# Patient Record
Sex: FEMALE | Race: WHITE | NOT HISPANIC OR LATINO | Employment: UNEMPLOYED | URBAN - NONMETROPOLITAN AREA
[De-identification: names, ages, dates, MRNs, and addresses within clinical notes are randomized per-mention and may not be internally consistent; named-entity substitution may affect disease eponyms.]

---

## 2024-03-08 ENCOUNTER — OFFICE VISIT (OUTPATIENT)
Dept: INTERNAL MEDICINE | Facility: CLINIC | Age: 64
End: 2024-03-08
Payer: MEDICAID

## 2024-03-08 VITALS
WEIGHT: 195 LBS | TEMPERATURE: 97.5 F | RESPIRATION RATE: 16 BRPM | SYSTOLIC BLOOD PRESSURE: 118 MMHG | BODY MASS INDEX: 34.55 KG/M2 | HEART RATE: 55 BPM | DIASTOLIC BLOOD PRESSURE: 68 MMHG | OXYGEN SATURATION: 95 % | HEIGHT: 63 IN

## 2024-03-08 DIAGNOSIS — E89.0 H/O PARTIAL THYROIDECTOMY: ICD-10-CM

## 2024-03-08 DIAGNOSIS — Z00.00 ANNUAL PHYSICAL EXAM: Primary | ICD-10-CM

## 2024-03-08 DIAGNOSIS — E66.09 CLASS 1 OBESITY DUE TO EXCESS CALORIES WITHOUT SERIOUS COMORBIDITY WITH BODY MASS INDEX (BMI) OF 34.0 TO 34.9 IN ADULT: ICD-10-CM

## 2024-03-08 DIAGNOSIS — E03.9 ACQUIRED HYPOTHYROIDISM: ICD-10-CM

## 2024-03-08 LAB
ALBUMIN SERPL-MCNC: 4.4 G/DL (ref 3.5–5.2)
ALBUMIN/GLOB SERPL: 2 G/DL
ALP SERPL-CCNC: 62 U/L (ref 39–117)
ALT SERPL-CCNC: 33 U/L (ref 1–33)
AST SERPL-CCNC: 26 U/L (ref 1–32)
BILIRUB SERPL-MCNC: 0.4 MG/DL (ref 0–1.2)
BUN SERPL-MCNC: 10 MG/DL (ref 8–23)
BUN/CREAT SERPL: 13.2 (ref 7–25)
CALCIUM SERPL-MCNC: 8.9 MG/DL (ref 8.6–10.5)
CHLORIDE SERPL-SCNC: 105 MMOL/L (ref 98–107)
CO2 SERPL-SCNC: 25 MMOL/L (ref 22–29)
CREAT SERPL-MCNC: 0.76 MG/DL (ref 0.57–1)
EGFRCR SERPLBLD CKD-EPI 2021: 89.3 ML/MIN/1.73
ERYTHROCYTE [DISTWIDTH] IN BLOOD BY AUTOMATED COUNT: 12.3 % (ref 12.3–15.4)
GLOBULIN SER CALC-MCNC: 2.2 GM/DL
GLUCOSE SERPL-MCNC: 90 MG/DL (ref 65–99)
HCT VFR BLD AUTO: 42.2 % (ref 34–46.6)
HGB BLD-MCNC: 13.8 G/DL (ref 12–15.9)
MCH RBC QN AUTO: 28.7 PG (ref 26.6–33)
MCHC RBC AUTO-ENTMCNC: 32.7 G/DL (ref 31.5–35.7)
MCV RBC AUTO: 87.7 FL (ref 79–97)
PLATELET # BLD AUTO: 242 10*3/MM3 (ref 140–450)
POTASSIUM SERPL-SCNC: 4.2 MMOL/L (ref 3.5–5.2)
PROT SERPL-MCNC: 6.6 G/DL (ref 6–8.5)
RBC # BLD AUTO: 4.81 10*6/MM3 (ref 3.77–5.28)
SODIUM SERPL-SCNC: 141 MMOL/L (ref 136–145)
T4 FREE SERPL-MCNC: 1.11 NG/DL (ref 0.93–1.7)
TSH SERPL DL<=0.005 MIU/L-ACNC: 0.36 UIU/ML (ref 0.27–4.2)
WBC # BLD AUTO: 5.32 10*3/MM3 (ref 3.4–10.8)

## 2024-03-08 RX ORDER — THYROID 60 MG/1
60 TABLET ORAL EVERY OTHER DAY
Qty: 90 TABLET | Refills: 1 | Status: CANCELLED | OUTPATIENT
Start: 2024-03-08

## 2024-03-08 NOTE — PROGRESS NOTES
Subjective   Ariela Truong is a 63 y.o. female and is here for a comprehensive physical exam. The patient reports no problems.    HPI: here today to establish care and get medication refills.   Partial thyroidectomy, never had chemo or radiation. On armour thyroid for about 10 years. She feels she tolerates this better than levothyroxine.   Denies fatigue, changes in bowel habits, and temperature intolerance.   See's holistic physician and prefers holistic approach.   She may be interested in cologuard, doesn't want to do at this time. Declines mammogram and all vaccinations. Never had abnormal mammogram in the past.    Health Habits:  Eye exam within last 2 years? Yes.   Dental exam every 6 months? No, will schedule.   Exercise habits: no structured exercise, stays active with house cleaning and hiking.  Healthy diet? Typical American diet.     The ASCVD Risk score (Adelina CONCEPCION, et al., 2019) failed to calculate for the following reasons:    Cannot find a previous HDL lab    Cannot find a previous total cholesterol lab      Do you take any herbs or supplements that were not prescribed by a doctor? yes- tumeric, selinium, D3-K2-zinc, cranberry supplement, celtic salt, kaya, berberine.    Are you taking calcium supplements? No  Are you taking aspirin daily? No     History:  LMP: No LMP recorded. Patient has had a hysterectomy.  Menopause: Yes  Last pap date: Total hysterectomy  Abnormal pap? no  Family history of breast or ovarian cancer: no    OB History   No obstetric history on file.     Sexual activity questions deferred to the physician.  The following portions of the patient's history were reviewed and updated as appropriate: She  has no past medical history on file.  She does not have any pertinent problems on file.  She  has a past surgical history that includes Thyroidectomy ();  section; and Hysterectomy ().  Her family history includes Bone cancer in her father; Cervical cancer in  "her mother; Glaucoma in her maternal grandmother; Heart failure in her paternal grandmother; Other in her father.  She  reports that she has never smoked. She has been exposed to tobacco smoke. She has never used smokeless tobacco. She reports that she does not currently use alcohol. She reports that she does not use drugs.  Current Outpatient Medications   Medication Sig Dispense Refill    thyroid (ARMOUR) 60 MG tablet Take 1 tablet by mouth every other day. 30 tablet 0     No current facility-administered medications for this visit.     Objective   /68   Pulse 55   Temp 97.5 °F (36.4 °C)   Resp 16   Ht 160 cm (63\")   Wt 88.5 kg (195 lb)   SpO2 95%   BMI 34.54 kg/m²     Physical Exam  Vitals and nursing note reviewed.   Constitutional:       General: She is not in acute distress.     Appearance: Normal appearance. She is obese.   HENT:      Right Ear: Tympanic membrane and ear canal normal.      Left Ear: Tympanic membrane and ear canal normal.      Nose: Nose normal.      Mouth/Throat:      Mouth: Mucous membranes are moist.      Pharynx: Oropharynx is clear. No posterior oropharyngeal erythema.   Eyes:      Extraocular Movements: Extraocular movements intact.      Pupils: Pupils are equal, round, and reactive to light.   Neck:      Thyroid: No thyroid mass or thyromegaly.      Vascular: No carotid bruit.   Cardiovascular:      Rate and Rhythm: Normal rate and regular rhythm.      Pulses: Normal pulses.      Heart sounds: Normal heart sounds. No murmur heard.  Pulmonary:      Effort: Pulmonary effort is normal.      Breath sounds: Normal breath sounds. No wheezing.   Abdominal:      General: Bowel sounds are normal. There is no distension.      Palpations: Abdomen is soft. There is no mass.      Tenderness: There is no abdominal tenderness.   Musculoskeletal:         General: Deformity present.      Cervical back: Normal range of motion and neck supple. No muscular tenderness.   Lymphadenopathy:      " Head:      Right side of head: No submandibular, tonsillar, preauricular or posterior auricular adenopathy.      Left side of head: No submandibular, tonsillar, preauricular or posterior auricular adenopathy.      Cervical: No cervical adenopathy.   Skin:     General: Skin is warm and dry.      Capillary Refill: Capillary refill takes less than 2 seconds.      Findings: No bruising or rash.   Neurological:      General: No focal deficit present.      Mental Status: She is alert and oriented to person, place, and time.      Gait: Gait normal.      Deep Tendon Reflexes: Reflexes normal.   Psychiatric:         Mood and Affect: Mood normal.         Behavior: Behavior normal.        Assessment & Plan   Healthy female exam.    Diagnosis Plan   1. Annual physical exam  Preventative maintenance discussed during visit and information provided in AVS.Discussed purpose of screenings is early detection, she continues to decline. Discussed risks and she understands.       2. Class 1 obesity due to excess calories without serious comorbidity with body mass index (BMI) of 34.0 to 34.9 in adult  CBC No Differential    Comprehensive metabolic panel    TSH Rfx On Abnormal To Free T4      3. Acquired hypothyroidism  CBC No Differential    Comprehensive metabolic panel    TSH Rfx On Abnormal To Free T4    Update TSH. Refills to be provided after lab review. Take daily away from other medications and supplements.       4. H/O partial thyroidectomy  CBC No Differential    Comprehensive metabolic panel    TSH Rfx On Abnormal To Free T4        2. Patient Counseling:  --Nutrition: Stressed importance of moderation in sodium/caffeine intake, saturated fat and cholesterol, caloric balance, sufficient intake of fresh fruits, vegetables, fiber, calcium, iron, and 1 g folate supplementation if of childbearing age.   --Discussed the issue of calcium supplement, and the daily use of baby aspirin if applicable.             --Mammogram recommended  every 2 years from age 40-49 and yearly beginning at age 50.  --Exercise: Stressed the importance of regular exercise.   --Substance Abuse: Discussed cessation/primary prevention of tobacco (if applicable), alcohol, or other drug use (if applicable); driving or other dangerous activities under the influence; availability of treatment for abuse.    --Sexuality: Discussed sexually transmitted diseases, partner selection, use of condoms, avoidance of unintended pregnancy  and contraceptive alternatives.   --Injury prevention: Discussed safety belts, safety helmets, smoke detector, smoking near bedding or upholstery.   --Dental health: Discussed importance of regular tooth brushing, flossing, and dental visits every 6 months.  --Immunizations reviewed.  --Discussed benefits of screening colonoscopy (if applicable).  --After hours service discussed with patient    3. Discussed the patient's BMI with her.  The BMI is above average; BMI management plan is completed  BMI is >= 30 and <35. (Class 1 Obesity). The following options were offered after discussion;: exercise counseling/recommendations, nutrition counseling/recommendations, and Information on healthy weight added to patient's after visit summary.     4. Return in about 1 year (around 3/8/2025) for Annual.  TRACY Luna  03/08/2024  09:35 EST

## 2024-03-11 ENCOUNTER — TELEPHONE (OUTPATIENT)
Dept: INTERNAL MEDICINE | Facility: CLINIC | Age: 64
End: 2024-03-11
Payer: MEDICAID

## 2024-03-11 RX ORDER — THYROID 60 MG/1
60 TABLET ORAL EVERY OTHER DAY
Qty: 90 TABLET | Refills: 3 | Status: SHIPPED | OUTPATIENT
Start: 2024-03-11 | End: 2024-03-13 | Stop reason: SDUPTHER

## 2024-03-11 NOTE — TELEPHONE ENCOUNTER
Caller: Ariela Truong    Relationship: Self    Best call back number: [phone]     What test was performed: LABS    When was the test performed: 03.08.24    Where was the test performed: OFFICE    Additional notes: CALL WITH RESULTS

## 2024-03-11 NOTE — TELEPHONE ENCOUNTER
Left VM advising patient that results have not been reviewed yet and we will contact her with results when available.

## 2024-03-13 ENCOUNTER — TELEPHONE (OUTPATIENT)
Dept: INTERNAL MEDICINE | Facility: CLINIC | Age: 64
End: 2024-03-13
Payer: MEDICAID

## 2024-03-13 DIAGNOSIS — E89.0 H/O PARTIAL THYROIDECTOMY: ICD-10-CM

## 2024-03-13 DIAGNOSIS — E03.9 ACQUIRED HYPOTHYROIDISM: Primary | ICD-10-CM

## 2024-03-13 DIAGNOSIS — E05.90 HYPERTHYROIDISM: ICD-10-CM

## 2024-03-13 RX ORDER — THYROID 60 MG/1
90 TABLET ORAL DAILY
Qty: 135 TABLET | Refills: 3 | Status: SHIPPED | OUTPATIENT
Start: 2024-03-13

## 2024-03-13 NOTE — TELEPHONE ENCOUNTER
Caller: Ariela Truong    Relationship: Self    Best call back number: [phone]     Which medication are you concerned about: THYROID 60 MG    Who prescribed you this medication: KAYCE    When did you start taking this medication: NA    What are your concerns: PATIENT IS CONCERNED HER MEDICATION IS DOSED WRONG AND WOULD LIKE A CALL BACK TO DISCUSS.     How long have you had these concerns: NA

## 2024-03-13 NOTE — TELEPHONE ENCOUNTER
Patient states she take Thyroid 90 mg daily. She is not sure where the Thyroid 60 mg QOD came from.

## 2024-07-31 ENCOUNTER — APPOINTMENT (OUTPATIENT)
Dept: GENERAL RADIOLOGY | Facility: HOSPITAL | Age: 64
End: 2024-07-31

## 2024-07-31 ENCOUNTER — HOSPITAL ENCOUNTER (EMERGENCY)
Facility: HOSPITAL | Age: 64
Discharge: HOME | End: 2024-07-31
Attending: EMERGENCY MEDICINE

## 2024-07-31 VITALS
BODY MASS INDEX: 33.66 KG/M2 | HEIGHT: 63 IN | SYSTOLIC BLOOD PRESSURE: 133 MMHG | WEIGHT: 190 LBS | RESPIRATION RATE: 18 BRPM | HEART RATE: 53 BPM | DIASTOLIC BLOOD PRESSURE: 69 MMHG | TEMPERATURE: 98 F | OXYGEN SATURATION: 100 %

## 2024-07-31 DIAGNOSIS — R07.9 CHEST PAIN, UNSPECIFIED TYPE: Primary | ICD-10-CM

## 2024-07-31 LAB
ALBUMIN SERPL-MCNC: 4.3 G/DL (ref 3.5–5.2)
ALBUMIN/GLOB SERPL: 1.7 G/DL
ALP SERPL-CCNC: 65 U/L (ref 39–117)
ALT SERPL W P-5'-P-CCNC: 27 U/L (ref 1–33)
ANION GAP SERPL CALCULATED.3IONS-SCNC: 11.7 MMOL/L (ref 5–15)
AST SERPL-CCNC: 25 U/L (ref 1–32)
BASOPHILS # BLD AUTO: 0.05 10*3/MM3 (ref 0–0.2)
BASOPHILS NFR BLD AUTO: 0.8 % (ref 0–1.5)
BILIRUB SERPL-MCNC: 0.4 MG/DL (ref 0–1.2)
BUN SERPL-MCNC: 14 MG/DL (ref 8–23)
BUN/CREAT SERPL: 21.9 (ref 7–25)
CALCIUM SPEC-SCNC: 9 MG/DL (ref 8.6–10.5)
CHLORIDE SERPL-SCNC: 104 MMOL/L (ref 98–107)
CO2 SERPL-SCNC: 24.3 MMOL/L (ref 22–29)
CREAT SERPL-MCNC: 0.64 MG/DL (ref 0.57–1)
D DIMER PPP FEU-MCNC: <0.27 MCGFEU/ML (ref 0–0.62)
DEPRECATED RDW RBC AUTO: 40.3 FL (ref 37–54)
EGFRCR SERPLBLD CKD-EPI 2021: 100.1 ML/MIN/1.73
EOSINOPHIL # BLD AUTO: 0.13 10*3/MM3 (ref 0–0.4)
EOSINOPHIL NFR BLD AUTO: 2.2 % (ref 0.3–6.2)
ERYTHROCYTE [DISTWIDTH] IN BLOOD BY AUTOMATED COUNT: 12.5 % (ref 12.3–15.4)
GEN 5 2HR TROPONIN T REFLEX: <6 NG/L
GLOBULIN UR ELPH-MCNC: 2.5 GM/DL
GLUCOSE SERPL-MCNC: 92 MG/DL (ref 65–99)
HCT VFR BLD AUTO: 40.5 % (ref 34–46.6)
HGB BLD-MCNC: 13.8 G/DL (ref 12–15.9)
HOLD SPECIMEN: NORMAL
HOLD SPECIMEN: NORMAL
IMM GRANULOCYTES # BLD AUTO: 0.03 10*3/MM3 (ref 0–0.05)
IMM GRANULOCYTES NFR BLD AUTO: 0.5 % (ref 0–0.5)
LYMPHOCYTES # BLD AUTO: 2.09 10*3/MM3 (ref 0.7–3.1)
LYMPHOCYTES NFR BLD AUTO: 35.2 % (ref 19.6–45.3)
MAGNESIUM SERPL-MCNC: 2 MG/DL (ref 1.6–2.4)
MCH RBC QN AUTO: 29.8 PG (ref 26.6–33)
MCHC RBC AUTO-ENTMCNC: 34.1 G/DL (ref 31.5–35.7)
MCV RBC AUTO: 87.5 FL (ref 79–97)
MONOCYTES # BLD AUTO: 0.74 10*3/MM3 (ref 0.1–0.9)
MONOCYTES NFR BLD AUTO: 12.5 % (ref 5–12)
NEUTROPHILS NFR BLD AUTO: 2.89 10*3/MM3 (ref 1.7–7)
NEUTROPHILS NFR BLD AUTO: 48.8 % (ref 42.7–76)
NRBC BLD AUTO-RTO: 0 /100 WBC (ref 0–0.2)
PLATELET # BLD AUTO: 191 10*3/MM3 (ref 140–450)
PMV BLD AUTO: 9.5 FL (ref 6–12)
POTASSIUM SERPL-SCNC: 4.2 MMOL/L (ref 3.5–5.2)
PROT SERPL-MCNC: 6.8 G/DL (ref 6–8.5)
RBC # BLD AUTO: 4.63 10*6/MM3 (ref 3.77–5.28)
SODIUM SERPL-SCNC: 140 MMOL/L (ref 136–145)
TROPONIN T DELTA: NORMAL
TROPONIN T SERPL HS-MCNC: <6 NG/L
WBC NRBC COR # BLD AUTO: 5.93 10*3/MM3 (ref 3.4–10.8)
WHOLE BLOOD HOLD COAG: NORMAL
WHOLE BLOOD HOLD SPECIMEN: NORMAL

## 2024-07-31 PROCEDURE — 83735 ASSAY OF MAGNESIUM: CPT

## 2024-07-31 PROCEDURE — 93005 ELECTROCARDIOGRAM TRACING: CPT | Performed by: EMERGENCY MEDICINE

## 2024-07-31 PROCEDURE — 71045 X-RAY EXAM CHEST 1 VIEW: CPT

## 2024-07-31 PROCEDURE — 85379 FIBRIN DEGRADATION QUANT: CPT

## 2024-07-31 PROCEDURE — 36415 COLL VENOUS BLD VENIPUNCTURE: CPT

## 2024-07-31 PROCEDURE — 85025 COMPLETE CBC W/AUTO DIFF WBC: CPT | Performed by: EMERGENCY MEDICINE

## 2024-07-31 PROCEDURE — 99284 EMERGENCY DEPT VISIT MOD MDM: CPT

## 2024-07-31 PROCEDURE — 93005 ELECTROCARDIOGRAM TRACING: CPT

## 2024-07-31 PROCEDURE — 80053 COMPREHEN METABOLIC PANEL: CPT | Performed by: EMERGENCY MEDICINE

## 2024-07-31 PROCEDURE — 84484 ASSAY OF TROPONIN QUANT: CPT | Performed by: EMERGENCY MEDICINE

## 2024-07-31 RX ORDER — SODIUM CHLORIDE 0.9 % (FLUSH) 0.9 %
10 SYRINGE (ML) INJECTION AS NEEDED
Status: DISCONTINUED | OUTPATIENT
Start: 2024-07-31 | End: 2024-07-31 | Stop reason: HOSPADM

## 2024-07-31 RX ORDER — ASPIRIN 325 MG
325 TABLET ORAL ONCE
Status: COMPLETED | OUTPATIENT
Start: 2024-07-31 | End: 2024-07-31

## 2024-07-31 RX ADMIN — ASPIRIN 325 MG: 325 TABLET ORAL at 11:13

## 2024-07-31 NOTE — DISCHARGE INSTRUCTIONS
Please follow-up with cardiologist soon as possible for further evaluation.  Someone from the [facility] will contact you to schedule the appointment within the next 3 days.  Return to the ER for any acute changes or worsening of your condition.  We also recommend you follow-up with your primary care provider in the next 2 to 3 days regarding your emergency department visit for chest pain.

## 2024-07-31 NOTE — ED PROVIDER NOTES
EMERGENCY DEPARTMENT ENCOUNTER    Pt Name: Ariela Truong  MRN: [id number]  Pt :   1960  Room Number:    Date of encounter:  2024  PCP: Savannah Stoddard APRN  ED Provider: Armand Fontana PA-C    Historian: Patient, nursing notes, daughter at bedside      HPI:  Chief Complaint: Chest pain        Context: Ariela Truong is a 64 y.o. female who presents to the ED c/o intermittent sharp substernal chest pain since yesterday afternoon.  Patient states that the pain will last for several seconds and has been happening multiple times an hour since the symptoms first began yesterday.  Patient denies any cough or congestion, fever or chills, headache, lightheadedness, dizziness, abdominal pain, or any other complaint.      PAST MEDICAL HISTORY  History reviewed. No pertinent past medical history.      PAST SURGICAL HISTORY  Past Surgical History:   Procedure Laterality Date     SECTION      , ,    HYSTERECTOMY  2008    THYROIDECTOMY  1984         FAMILY HISTORY  Family History   Problem Relation Age of Onset    Cervical cancer Mother     Other Father         MTHFR gene    Bone cancer Father     Glaucoma Maternal Grandmother     Heart failure Paternal Grandmother          SOCIAL HISTORY  Social History     Socioeconomic History    Marital status:    Tobacco Use    Smoking status: Never     Passive exposure: Past    Smokeless tobacco: Never   Vaping Use    Vaping status: Never Used   Substance and Sexual Activity    Alcohol use: Not Currently    Drug use: Never    Sexual activity: Defer     Birth control/protection: Hysterectomy         ALLERGIES  Patient has no known allergies.        REVIEW OF SYSTEMS  Review of Systems   Constitutional:  Negative for chills and fever.   HENT:  Negative for congestion and sore throat.    Respiratory:  Negative for cough, chest tightness, shortness of breath and wheezing.    Cardiovascular:  Positive for chest pain. Negative for palpitations  and leg swelling.   Gastrointestinal:  Negative for abdominal pain, nausea and vomiting.   Genitourinary:  Negative for dysuria.   Musculoskeletal:  Negative for back pain.   Skin:  Negative for wound.   Neurological:  Negative for dizziness and headaches.   Psychiatric/Behavioral:  Negative for confusion.    All other systems reviewed and are negative.         All systems reviewed and negative except for those discussed in HPI.       PHYSICAL EXAM    I have reviewed the triage vital signs and nursing notes.    ED Triage Vitals [07/31/24 1049]   Temp Heart Rate Resp BP SpO2   98 °F (36.7 °C) 58 16 134/91 98 %      Temp src Heart Rate Source Patient Position BP Location FiO2 (%)   Oral Monitor Sitting Left arm --       Physical Exam  Vitals and nursing note reviewed.   Constitutional:       General: She is not in acute distress.     Appearance: She is not ill-appearing, toxic-appearing or diaphoretic.   HENT:      Head: Normocephalic and atraumatic.      Mouth/Throat:      Mouth: Mucous membranes are moist.      Pharynx: Oropharynx is clear.   Eyes:      Extraocular Movements: Extraocular movements intact.   Cardiovascular:      Rate and Rhythm: Normal rate.      Pulses:           Radial pulses are 2+ on the right side and 2+ on the left side.      Heart sounds: Normal heart sounds.   Pulmonary:      Effort: Pulmonary effort is normal. No respiratory distress.      Breath sounds: Normal breath sounds. No decreased breath sounds, wheezing, rhonchi or rales.   Chest:      Chest wall: No tenderness or crepitus.   Abdominal:      Tenderness: There is no abdominal tenderness.   Musculoskeletal:      Right lower leg: No edema.      Left lower leg: No edema.   Skin:     General: Skin is warm and dry.      Findings: No rash.   Neurological:      Mental Status: She is alert.             LAB RESULTS  Recent Results (from the past 24 hour(s))   Comprehensive Metabolic Panel    Collection Time: 07/31/24 11:03 AM    Specimen:  Blood   Result Value Ref Range    Glucose 92 65 - 99 mg/dL    BUN 14 8 - 23 mg/dL    Creatinine 0.64 0.57 - 1.00 mg/dL    Sodium 140 136 - 145 mmol/L    Potassium 4.2 3.5 - 5.2 mmol/L    Chloride 104 98 - 107 mmol/L    CO2 24.3 22.0 - 29.0 mmol/L    Calcium 9.0 8.6 - 10.5 mg/dL    Total Protein 6.8 6.0 - 8.5 g/dL    Albumin 4.3 3.5 - 5.2 g/dL    ALT (SGPT) 27 1 - 33 U/L    AST (SGOT) 25 1 - 32 U/L    Alkaline Phosphatase 65 39 - 117 U/L    Total Bilirubin 0.4 0.0 - 1.2 mg/dL    Globulin 2.5 gm/dL    A/G Ratio 1.7 g/dL    BUN/Creatinine Ratio 21.9 7.0 - 25.0    Anion Gap 11.7 5.0 - 15.0 mmol/L    eGFR 100.1 >60.0 mL/min/1.73   High Sensitivity Troponin T    Collection Time: 07/31/24 11:03 AM    Specimen: Blood   Result Value Ref Range    HS Troponin T <6 <14 ng/L   Green Top (Gel)    Collection Time: 07/31/24 11:03 AM   Result Value Ref Range    Extra Tube Hold for add-ons.    Lavender Top    Collection Time: 07/31/24 11:03 AM   Result Value Ref Range    Extra Tube hold for add-on    Gold Top - SST    Collection Time: 07/31/24 11:03 AM   Result Value Ref Range    Extra Tube Hold for add-ons.    Light Blue Top    Collection Time: 07/31/24 11:03 AM   Result Value Ref Range    Extra Tube Hold for add-ons.    CBC Auto Differential    Collection Time: 07/31/24 11:03 AM    Specimen: Blood   Result Value Ref Range    WBC 5.93 3.40 - 10.80 10*3/mm3    RBC 4.63 3.77 - 5.28 10*6/mm3    Hemoglobin 13.8 12.0 - 15.9 g/dL    Hematocrit 40.5 34.0 - 46.6 %    MCV 87.5 79.0 - 97.0 fL    MCH 29.8 26.6 - 33.0 pg    MCHC 34.1 31.5 - 35.7 g/dL    RDW 12.5 12.3 - 15.4 %    RDW-SD 40.3 37.0 - 54.0 fl    MPV 9.5 6.0 - 12.0 fL    Platelets 191 140 - 450 10*3/mm3    Neutrophil % 48.8 42.7 - 76.0 %    Lymphocyte % 35.2 19.6 - 45.3 %    Monocyte % 12.5 (H) 5.0 - 12.0 %    Eosinophil % 2.2 0.3 - 6.2 %    Basophil % 0.8 0.0 - 1.5 %    Immature Grans % 0.5 0.0 - 0.5 %    Neutrophils, Absolute 2.89 1.70 - 7.00 10*3/mm3    Lymphocytes, Absolute 2.09  0.70 - 3.10 10*3/mm3    Monocytes, Absolute 0.74 0.10 - 0.90 10*3/mm3    Eosinophils, Absolute 0.13 0.00 - 0.40 10*3/mm3    Basophils, Absolute 0.05 0.00 - 0.20 10*3/mm3    Immature Grans, Absolute 0.03 0.00 - 0.05 10*3/mm3    nRBC 0.0 0.0 - 0.2 /100 WBC   Magnesium    Collection Time: 07/31/24 11:03 AM    Specimen: Blood   Result Value Ref Range    Magnesium 2.0 1.6 - 2.4 mg/dL   D-dimer, Quantitative    Collection Time: 07/31/24 11:03 AM    Specimen: Blood   Result Value Ref Range    D-Dimer, Quantitative <0.27 0.00 - 0.62 MCGFEU/mL   High Sensitivity Troponin T 2Hr    Collection Time: 07/31/24  1:11 PM    Specimen: Blood   Result Value Ref Range    HS Troponin T <6 <14 ng/L    Troponin T Delta         If labs were ordered, I independently reviewed the results and considered them in treating the patient.        RADIOLOGY  XR Chest 1 View    Result Date: 7/31/2024  PROCEDURE: XR CHEST 1 VW-    HISTORY: Central chest pain, shortness of breath, Chest Pain Triage Protocol  COMPARISON: May 2016.  FINDINGS: Apical lordotic view. The heart is borderline in size. The mediastinum is unremarkable. The lungs are clear. There is no pneumothorax. There are no acute osseous abnormalities.      No acute cardiopulmonary process.        Images were reviewed, interpreted, and dictated by Dr. Alycia Nugent MD Transcribed by Qiana Kim PA-C.  This report was signed and finalized on 7/31/2024 11:52 AM by Alycia Nugent MD.       I ordered and independently reviewed the above noted radiographic studies.      I viewed images of CXR which showed no acute cardiopulmonary process per my independent interpretation.    See radiologist's dictation for official interpretation.        PROCEDURES    Procedures    ECG 12 Lead ED Triage Standing Order; Chest Pain   Final Result          MEDICATIONS GIVEN IN ER    Medications   sodium chloride 0.9 % flush 10 mL (has no administration in time range)   aspirin tablet 325 mg (325 mg Oral Given  7/31/24 1113)         MEDICAL DECISION MAKING, PROGRESS, and CONSULTS    All labs, if obtained, have been independently reviewed by me.  All radiology studies, if obtained, have been reviewed by me and the radiologist dictating the report.  All EKG's, if obtained, have been independently viewed and interpreted by me/my attending physician.      Discussion below represents my analysis of pertinent findings related to patient's condition, differential diagnosis, treatment plan and final disposition.    64-year-old female evaluated for chest pain since yesterday.  She is upright alert oriented in no acute distress resting comfortably in the exam bed communicating in full and normal sentences.  Her vital signs as interpreted by me are all stable and within normal limits.  Pulse oximetry 98% on room air.  Her lungs are clear to auscultation bilaterally, she has no tenderness to palpation of the chest wall.  Belly is soft and nontender.  Extensive laboratory workup was initiated for chest pain as well as chest x-ray and EKG.    EKG per ED attending Dr. Samaniego's independent interpretation showed no acute ischemic changes, normal sinus rhythm, normal rate, no STEMI.  Chest x-ray per radiologist interpretation showed no acute cardiopulmonary process.  CBC showed no leukocytosis with a normal hemoglobin and hematocrit.  CMP was clinically unremarkable with electrolytes within normal limits.  Initial high-sensitivity troponin was less than 6.  Magnesium normal.  D-dimer also within normal limits lowering any suspicion for pulmonary embolism.  2-hour repeat troponin was also less than 6.    The patient has a heart score of 2.  I did discuss with her at length that while she was not having an acute cardiac event today, we cannot rule out that her chest pain symptoms are due to an angina related pain and she may require further workup with a cardiologist.  I did offer this patient admission to the hospital for further  observation for chest pain which she refused.  I ordered an urgent cardiology referral for the patient to follow-up with cardiologist regarding her chest pain and explained the referral process.  Patient verbalized understanding of and agreement with today's plan and will return to the ER for any acute changes or worsening.        HEART Score: 2                Differential diagnosis:    Differential diagnosis included but was not limited to ACS, gastritis, GERD, pneumonia, PE      Additional sources:    - Discussed/ obtained information from independent historians:  daughter at bedside in addition to patient    - External (non-ED) record review: Radiology report from chest x-ray performed on 5/27/2016 showing no acute cardiopulmonary process per radiology    - Chronic or social conditions impacting care:  none    Orders placed during this visit:  Orders Placed This Encounter   Procedures    XR Chest 1 View    [place] Draw    Comprehensive Metabolic Panel    High Sensitivity Troponin T    CBC Auto Differential    Magnesium    D-dimer, Quantitative    High Sensitivity Troponin T 2Hr    Ambulatory Referral to Cardiology    NPO Diet NPO Type: Strict NPO    Undress & Gown    Continuous Pulse Oximetry    Oxygen Therapy- Nasal Cannula; Titrate 1-6 LPM Per SpO2; 90 - 95%    ECG 12 Lead ED Triage Standing Order; Chest Pain    ECG 12 Lead ED Triage Standing Order; Chest Pain    Insert Peripheral IV    CBC & Differential    Green Top (Gel)    Lavender Top    Gold Top - SST    Light Blue Top         Additional orders considered but not ordered: Considered CT PE study however patient's vital signs are all stable and within normal limits, she is not hypoxic with a pulse oximetry independently interpreted by me at 98% on room air and D-dimer was within normal limits therefore I do not feel that the benefit outweighed the risks of CT imaging at this time as I had very low suspicion for PE.      ED Course:    Consultants: None                HEART Score (for prediction of 6-week risk of major adverse cardiac event) reviewed and/or performed as part of the patient evaluation and treatment planning process.  The result associated with this review/performance is: 2    Wells' Criteria (for pulmonary embolism) reviewed and/or performed as part of the patient evaluation and treatment planning process.  The result associated with this review/performance is: 0      Shared Decision Making:  After my consideration of clinical presentation and any laboratory/radiology studies obtained, I discussed the findings with the patient/patient representative who is in agreement with the treatment plan and the final disposition.   Risks and benefits of discharge and/or observation/admission were discussed.       AS OF 13:55 EDT VITALS:    BP - 135/77  HR - 56  TEMP - 98 °F (36.7 °C) (Oral)  O2 SATS - 98%                  DIAGNOSIS  Final diagnoses:   Chest pain, unspecified type         DISPOSITION  Discharge home      Please note that portions of this document were completed with voice recognition software.      Armand Fontana PA-C  07/31/24 7028

## 2025-03-12 DIAGNOSIS — E03.9 ACQUIRED HYPOTHYROIDISM: ICD-10-CM

## 2025-03-12 DIAGNOSIS — E89.0 H/O PARTIAL THYROIDECTOMY: ICD-10-CM

## 2025-03-12 DIAGNOSIS — E05.90 HYPERTHYROIDISM: ICD-10-CM

## 2025-03-12 NOTE — TELEPHONE ENCOUNTER
Caller: Ariela Truong    Relationship: Self    Best call back number: [phone]     Requested Prescriptions:   Requested Prescriptions     Pending Prescriptions Disp Refills    Thyroid 60 MG PO tablet 135 tablet 3     Sig: Take 1.5 tablets by mouth Daily.        Pharmacy where request should be sent:  [facility] [id number] [facility] [address] AT [facility] [phone] [facility] [phone] FX     Last office visit with prescribing clinician: 3/8/2024   Last telemedicine visit with prescribing clinician: Visit date not found   Next office visit with prescribing clinician: Visit date not found

## 2025-03-14 RX ORDER — THYROID 60 MG/1
90 TABLET ORAL DAILY
Qty: 45 TABLET | Refills: 0 | Status: SHIPPED | OUTPATIENT
Start: 2025-03-14

## 2025-03-16 DIAGNOSIS — E03.9 ACQUIRED HYPOTHYROIDISM: ICD-10-CM

## 2025-03-16 DIAGNOSIS — E05.90 HYPERTHYROIDISM: ICD-10-CM

## 2025-03-16 DIAGNOSIS — E89.0 H/O PARTIAL THYROIDECTOMY: ICD-10-CM

## 2025-03-17 RX ORDER — THYROID,PORK 60 MG
90 TABLET ORAL DAILY
Qty: 135 TABLET | OUTPATIENT
Start: 2025-03-17

## 2025-03-26 ENCOUNTER — OFFICE VISIT (OUTPATIENT)
Dept: INTERNAL MEDICINE | Facility: CLINIC | Age: 65
End: 2025-03-26

## 2025-03-26 VITALS
OXYGEN SATURATION: 97 % | SYSTOLIC BLOOD PRESSURE: 127 MMHG | DIASTOLIC BLOOD PRESSURE: 84 MMHG | WEIGHT: 198 LBS | BODY MASS INDEX: 35.08 KG/M2 | HEIGHT: 63 IN | TEMPERATURE: 97.5 F | RESPIRATION RATE: 16 BRPM | HEART RATE: 91 BPM

## 2025-03-26 DIAGNOSIS — E89.0 H/O PARTIAL THYROIDECTOMY: Primary | ICD-10-CM

## 2025-03-26 DIAGNOSIS — E66.812 CLASS 2 OBESITY DUE TO EXCESS CALORIES WITHOUT SERIOUS COMORBIDITY WITH BODY MASS INDEX (BMI) OF 35.0 TO 35.9 IN ADULT: ICD-10-CM

## 2025-03-26 DIAGNOSIS — E03.9 ACQUIRED HYPOTHYROIDISM: ICD-10-CM

## 2025-03-26 DIAGNOSIS — E66.09 CLASS 2 OBESITY DUE TO EXCESS CALORIES WITHOUT SERIOUS COMORBIDITY WITH BODY MASS INDEX (BMI) OF 35.0 TO 35.9 IN ADULT: ICD-10-CM

## 2025-03-26 NOTE — PROGRESS NOTES
"  Office Visit      Patient Name: Ariela Truong  : 1960   MRN: [id number]   Care Team: Patient Care Team:  Savannah Stoddard APRN as PCP - General (Family Medicine)    Chief Complaint  Med Refill    Subjective     Subjective      Ariela Truong presents to [facility] for hypothyroidism check up.    Previously was on levothyroxine but did not do well on this due to fatigue and brain fog, was switched to armour thyroid 12 years ago and doing well on it. Last had labs last year.   Denies fatigue, extreme brain fog, slow heart rate .    Follows more of a holistic medicine model incorporating tumeric, vitamins, and keltic salt into her daily routine.     Deferred screenings and vaccinations at this time.       Objective     Objective   Vital Signs:   /84   Pulse 91   Temp 97.5 °F (36.4 °C)   Resp 16   Ht 160 cm (63\")   Wt 89.8 kg (198 lb)   SpO2 97%   BMI 35.07 kg/m²         Physical Exam  Vitals and nursing note reviewed.   Constitutional:       Appearance: She is obese.   Eyes:      Extraocular Movements: Extraocular movements intact.      Pupils: Pupils are equal, round, and reactive to light.   Neck:      Thyroid: No thyromegaly or thyroid tenderness.      Vascular: No carotid bruit.   Cardiovascular:      Rate and Rhythm: Normal rate and regular rhythm.      Pulses: Normal pulses.      Heart sounds: Normal heart sounds. No murmur heard.  Pulmonary:      Effort: Pulmonary effort is normal. No respiratory distress.      Breath sounds: Normal breath sounds. No wheezing or rhonchi.   Abdominal:      General: Bowel sounds are normal. There is no distension.      Palpations: Abdomen is soft. There is no mass.      Tenderness: There is no abdominal tenderness. There is no guarding.   Musculoskeletal:      Cervical back: No tenderness.      Right lower leg: No edema.      Left lower leg: No edema.   Lymphadenopathy:      Cervical: No cervical adenopathy.   Skin:     " General: Skin is warm and dry.      Coloration: Skin is not pale.      Findings: No bruising, erythema or rash.   Neurological:      General: No focal deficit present.      Mental Status: She is alert and oriented to person, place, and time.   Psychiatric:         Mood and Affect: Mood normal.         Behavior: Behavior normal.          Assessment / Plan      Assessment & Plan   Problem List Items Addressed This Visit          ENT    H/O partial thyroidectomy - Primary    Relevant Orders    Comprehensive metabolic panel    TSH    T4, free    Continue Potrero Thyroid 90mg daily. Will update labs at this time and titrate medication as clinically indicated. Continue taking as prescribed. Come back in for annual physical exam.        Endocrine and Metabolic    Hypothyroidism    See plan above.     Other Visit Diagnoses         Class 2 obesity due to excess calories without serious comorbidity with body mass index (BMI) of 35.0 to 35.9 in adult        Encouraged watching portion sizes, high protein/low carb diet, decreasing sugary drinks and increase water intake. Encourage 30minutes of moderate intensity exercise 5x/week. Encouraged vaccinations and screenings, discussed benefits and she declines.    This note accurately reflects work and decisions made by me.    Follow Up   Return for Annual physical.  Patient was given instructions and counseling regarding her condition or for health maintenance advice. Please see specific information pulled into the AVS if appropriate.     TRACY Warner  [facility] [place]

## 2025-03-27 LAB
ALBUMIN SERPL-MCNC: 4.3 G/DL (ref 3.5–5.2)
ALBUMIN/GLOB SERPL: 1.6 G/DL
ALP SERPL-CCNC: 62 U/L (ref 39–117)
ALT SERPL-CCNC: 34 U/L (ref 1–33)
AST SERPL-CCNC: 30 U/L (ref 1–32)
BILIRUB SERPL-MCNC: 0.5 MG/DL (ref 0–1.2)
BUN SERPL-MCNC: 15 MG/DL (ref 8–23)
BUN/CREAT SERPL: 17.9 (ref 7–25)
CALCIUM SERPL-MCNC: 9.9 MG/DL (ref 8.6–10.5)
CHLORIDE SERPL-SCNC: 103 MMOL/L (ref 98–107)
CO2 SERPL-SCNC: 26.8 MMOL/L (ref 22–29)
CREAT SERPL-MCNC: 0.84 MG/DL (ref 0.57–1)
EGFRCR SERPLBLD CKD-EPI 2021: 78.2 ML/MIN/1.73
GLOBULIN SER CALC-MCNC: 2.7 GM/DL
GLUCOSE SERPL-MCNC: 97 MG/DL (ref 65–99)
POTASSIUM SERPL-SCNC: 4.3 MMOL/L (ref 3.5–5.2)
PROT SERPL-MCNC: 7 G/DL (ref 6–8.5)
SODIUM SERPL-SCNC: 142 MMOL/L (ref 136–145)
T4 FREE SERPL-MCNC: 0.9 NG/DL (ref 0.92–1.68)
TSH SERPL DL<=0.005 MIU/L-ACNC: 0.57 UIU/ML (ref 0.27–4.2)